# Patient Record
Sex: MALE | Race: WHITE
[De-identification: names, ages, dates, MRNs, and addresses within clinical notes are randomized per-mention and may not be internally consistent; named-entity substitution may affect disease eponyms.]

---

## 2020-01-23 ENCOUNTER — HOSPITAL ENCOUNTER (EMERGENCY)
Dept: HOSPITAL 47 - EC | Age: 45
LOS: 1 days | Discharge: HOME | End: 2020-01-24
Payer: COMMERCIAL

## 2020-01-23 VITALS — TEMPERATURE: 97.8 F

## 2020-01-23 DIAGNOSIS — R10.10: Primary | ICD-10-CM

## 2020-01-23 DIAGNOSIS — R74.0: ICD-10-CM

## 2020-01-23 DIAGNOSIS — Z88.0: ICD-10-CM

## 2020-01-23 DIAGNOSIS — Z90.49: ICD-10-CM

## 2020-01-23 DIAGNOSIS — R11.0: ICD-10-CM

## 2020-01-23 LAB
ALBUMIN SERPL-MCNC: 4.7 G/DL (ref 3.5–5)
ALP SERPL-CCNC: 97 U/L (ref 38–126)
ALT SERPL-CCNC: 157 U/L (ref 4–49)
AMYLASE SERPL-CCNC: 64 U/L (ref 30–110)
ANION GAP SERPL CALC-SCNC: 11 MMOL/L
AST SERPL-CCNC: 337 U/L (ref 17–59)
BASOPHILS # BLD AUTO: 0.1 K/UL (ref 0–0.2)
BASOPHILS NFR BLD AUTO: 1 %
BUN SERPL-SCNC: 12 MG/DL (ref 9–20)
CALCIUM SPEC-MCNC: 9.2 MG/DL (ref 8.4–10.2)
CHLORIDE SERPL-SCNC: 109 MMOL/L (ref 98–107)
CO2 SERPL-SCNC: 22 MMOL/L (ref 22–30)
EOSINOPHIL # BLD AUTO: 0.1 K/UL (ref 0–0.7)
EOSINOPHIL NFR BLD AUTO: 1 %
ERYTHROCYTE [DISTWIDTH] IN BLOOD BY AUTOMATED COUNT: 4.83 M/UL (ref 4.3–5.9)
ERYTHROCYTE [DISTWIDTH] IN BLOOD: 12.9 % (ref 11.5–15.5)
GLUCOSE SERPL-MCNC: 108 MG/DL (ref 74–99)
HCT VFR BLD AUTO: 42.5 % (ref 39–53)
HGB BLD-MCNC: 14.3 GM/DL (ref 13–17.5)
LYMPHOCYTES # SPEC AUTO: 2.2 K/UL (ref 1–4.8)
LYMPHOCYTES NFR SPEC AUTO: 23 %
MCH RBC QN AUTO: 29.6 PG (ref 25–35)
MCHC RBC AUTO-ENTMCNC: 33.7 G/DL (ref 31–37)
MCV RBC AUTO: 87.9 FL (ref 80–100)
MONOCYTES # BLD AUTO: 0.5 K/UL (ref 0–1)
MONOCYTES NFR BLD AUTO: 5 %
NEUTROPHILS # BLD AUTO: 6.6 K/UL (ref 1.3–7.7)
NEUTROPHILS NFR BLD AUTO: 69 %
PH UR: 7 [PH] (ref 5–8)
PLATELET # BLD AUTO: 261 K/UL (ref 150–450)
POTASSIUM SERPL-SCNC: 4.3 MMOL/L (ref 3.5–5.1)
PROT SERPL-MCNC: 7.9 G/DL (ref 6.3–8.2)
SODIUM SERPL-SCNC: 142 MMOL/L (ref 137–145)
SP GR UR: 1.01 (ref 1–1.03)
UROBILINOGEN UR QL STRIP: 2 MG/DL (ref ?–2)
WBC # BLD AUTO: 9.6 K/UL (ref 3.8–10.6)

## 2020-01-23 PROCEDURE — 81003 URINALYSIS AUTO W/O SCOPE: CPT

## 2020-01-23 PROCEDURE — 96376 TX/PRO/DX INJ SAME DRUG ADON: CPT

## 2020-01-23 PROCEDURE — 82150 ASSAY OF AMYLASE: CPT

## 2020-01-23 PROCEDURE — 74018 RADEX ABDOMEN 1 VIEW: CPT

## 2020-01-23 PROCEDURE — 96361 HYDRATE IV INFUSION ADD-ON: CPT

## 2020-01-23 PROCEDURE — 80053 COMPREHEN METABOLIC PANEL: CPT

## 2020-01-23 PROCEDURE — 74177 CT ABD & PELVIS W/CONTRAST: CPT

## 2020-01-23 PROCEDURE — 83690 ASSAY OF LIPASE: CPT

## 2020-01-23 PROCEDURE — 36415 COLL VENOUS BLD VENIPUNCTURE: CPT

## 2020-01-23 PROCEDURE — 76705 ECHO EXAM OF ABDOMEN: CPT

## 2020-01-23 PROCEDURE — 99284 EMERGENCY DEPT VISIT MOD MDM: CPT

## 2020-01-23 PROCEDURE — 96374 THER/PROPH/DIAG INJ IV PUSH: CPT

## 2020-01-23 PROCEDURE — 96375 TX/PRO/DX INJ NEW DRUG ADDON: CPT

## 2020-01-23 PROCEDURE — 85025 COMPLETE CBC W/AUTO DIFF WBC: CPT

## 2020-01-23 NOTE — XR
EXAMINATION TYPE: XR KUB 2 views

 

DATE OF EXAM: 1/23/2020 9:04 PM

 

CLINICAL HISTORY:  Abdominal pain, epigastric. History colitis

 

TECHNIQUE: 2 upright views

 

COMPARISON: None.

 

FINDINGS: The visualized lung bases and pleural spaces are negative. 

 

The stomach is fluid-filled. 

 

There is no pneumoperitoneum or pneumatosis. 

 

There is excessive volume of stool throughout the colon. 

 

There is no bowel obstruction.

 

No acute skeletal or soft tissue findings.

 

IMPRESSION: No definite acute radiographic process.

## 2020-01-23 NOTE — CT
EXAMINATION TYPE: CT abdomen pelvis w con

 

DATE OF EXAM: 1/23/2020

 

COMPARISON: None

 

HISTORY: epigastric abd pain

 

CT DLP: 1377.5 mGycm

Automated exposure control for dose reduction was used.

 

CONTRAST: 

Performed with IV Contrast, patient injected with 100 mL of Isovue 300.

 

Multiple axial sections were obtained from the diaphragm to the floor the pelvis with intravenous con
trast Isovue 100 mL.

 

FINDINGS:

Lung bases are clear. There is no pleural effusion. Heart size is normal. There is no pericardial eff
usion. Stomach appears normal.

 

Liver spleen pancreas appear normal. There are clips from cholecystectomy. Bile ducts are not dilated
.

 

There is no adrenal mass. Kidneys show satisfactory contrast opacification. There is no hydronephrosi
s. Appendix appears normal.

 

There is no mesenteric edema. There is no ascites or free air. There is no sign of a bowel obstructio
n.

 

Lumbar vertebra have normal spacing and alignment. There is no compression fracture. Bony pelvis appe
ars intact.

 

IMPRESSION:

Negative CT scan of the abdomen and pelvis. Normal appendix.

## 2020-01-23 NOTE — US
EXAMINATION TYPE: US abdomen limited

 

DATE OF EXAM:   1/23/2020

 

COMPARISON: NONE

 

CLINICAL HISTORY: RUQ, binh 4 months ago, transaminitis. RUQ pain x 7 hours. Hx cholecystectomy 4 mo
nths ago. Transaminitis.

 

EXAM MEASUREMENTS:

Liver Length:  16.2 cm   

Gallbladder Wall:  Cholecystectomy   

CBD:  0.80 cm

Right Kidney:  11.0 x 5.3 x 4.5 cm 

 

***Limited visualization due to overlying bowel gas.

 

Pancreas:  Obscured by overlying bowel gas.

Liver:  No abnormalities seen.  

**Evidence for sonographic Llanes's sign:  No

CBD:  Measures wnl post-cholecystectomy. 

Right Kidney:  No hydronephrosis or masses seen   

 

 

IMPRESSION: Negative examination.

## 2020-01-23 NOTE — ED
General Adult HPI





- General


Chief complaint: Abdominal Pain


Stated complaint: Abd pain


Time Seen by Provider: 01/23/20 19:46


Source: patient, RN notes reviewed


Mode of arrival: ambulatory


Limitations: no limitations





- History of Present Illness


Initial comments: 





44-year-old male presents to the emergency department for a chief complaint of 

upper abdominal pain.  This has been ongoing since this afternoon around 3:00 

PM.  States it comes in waves.  Patient states that this feels exactly like his 

"gallbladder attacks".  States that he had his gallbladder removed in October 

through another health system.  He states that his follow-ups have all been n

ormal.  He states he did have the ducts checked and there were no stones after 

the surgery.  Patient states the pain earlier caused him to have some nausea as 

well.  He states he has felt bloated all week and that also proceeded his 

previous gallbladder attack.Patient has no other complaints at this time 

including shortness of breath, chest pain, nausea or vomiting, headache, or 

visual changes.





- Related Data


                                    Allergies











Allergy/AdvReac Type Severity Reaction Status Date / Time


 


Penicillins Allergy  Unknown Verified 01/23/20 19:01





   Childhood  














Review of Systems


ROS Statement: 


Those systems with pertinent positive or pertinent negative responses have been 

documented in the HPI.





ROS Other: All systems not noted in ROS Statement are negative.





Past Medical History


Past Medical History: No Reported History, Cancer


History of Any Multi-Drug Resistant Organisms: None Reported


Past Surgical History: Cholecystectomy


Past Psychological History: No Psychological Hx Reported


Smoking Status: Never smoker


Past Alcohol Use History: None Reported


Past Drug Use History: None Reported





General Exam


Limitations: no limitations


General appearance: alert, in no apparent distress


Head exam: Present: atraumatic, normocephalic, normal inspection


Eye exam: Present: normal appearance, PERRL, EOMI.  Absent: scleral icterus, 

conjunctival injection, periorbital swelling


ENT exam: Present: normal exam, mucous membranes moist


Neck exam: Present: normal inspection.  Absent: tenderness, meningismus, 

lymphadenopathy


Respiratory exam: Present: normal lung sounds bilaterally.  Absent: respiratory 

distress, wheezes, rales, rhonchi, stridor


Cardiovascular Exam: Present: regular rate, normal rhythm, normal heart sounds. 

Absent: systolic murmur, diastolic murmur, rubs, gallop, clicks


GI/Abdominal exam: Present: soft, normal bowel sounds.  Absent: distended, tende

rness, guarding, rebound, rigid


Neurological exam: Present: alert, oriented X3, CN II-XII intact





Course


                                   Vital Signs











  01/23/20





  18:57


 


Temperature 97.8 F


 


Pulse Rate 82


 


Respiratory 17





Rate 


 


Blood Pressure 119/79


 


O2 Sat by Pulse 98





Oximetry 














Medical Decision Making





- Medical Decision Making





CBC unremarkable.  CMP does show mild transaminitis however I do not have a 

comparison for patient.  Urinalysis unremarkable.  Abdomen ultrasound was 

initially ordered which revealed a negative exam.  Bile duct measures within 

normal limits post cholecystectomy.  CT abdomen and pelvis is negative.  Bile 

ducts are not dilated.  Liver spleen and pancreas appear normal.  Patient 

reevaluated and is pain-free at this time.  He does request a dose of Dilaudid 

before he leaves because he is concerned the pain may recur.  I discussed that 

he needs to follow up with his surgeon tomorrow.  I discussed that he needs to 

return here if you've any worsening symptoms that she does agree with.  He will 

also follow up with primary care for repeat liver enzymes.





- Lab Data


Result diagrams: 


                                 01/23/20 20:30





                                 01/23/20 20:30


                                   Lab Results











  01/23/20 01/23/20 01/23/20 Range/Units





  20:30 20:30 20:30 


 


WBC   9.6   (3.8-10.6)  k/uL


 


RBC   4.83   (4.30-5.90)  m/uL


 


Hgb   14.3   (13.0-17.5)  gm/dL


 


Hct   42.5   (39.0-53.0)  %


 


MCV   87.9   (80.0-100.0)  fL


 


MCH   29.6   (25.0-35.0)  pg


 


MCHC   33.7   (31.0-37.0)  g/dL


 


RDW   12.9   (11.5-15.5)  %


 


Plt Count   261   (150-450)  k/uL


 


Neutrophils %   69   %


 


Lymphocytes %   23   %


 


Monocytes %   5   %


 


Eosinophils %   1   %


 


Basophils %   1   %


 


Neutrophils #   6.6   (1.3-7.7)  k/uL


 


Lymphocytes #   2.2   (1.0-4.8)  k/uL


 


Monocytes #   0.5   (0-1.0)  k/uL


 


Eosinophils #   0.1   (0-0.7)  k/uL


 


Basophils #   0.1   (0-0.2)  k/uL


 


Sodium  142    (137-145)  mmol/L


 


Potassium  4.3    (3.5-5.1)  mmol/L


 


Chloride  109 H    ()  mmol/L


 


Carbon Dioxide  22    (22-30)  mmol/L


 


Anion Gap  11    mmol/L


 


BUN  12    (9-20)  mg/dL


 


Creatinine  0.75    (0.66-1.25)  mg/dL


 


Est GFR (CKD-EPI)AfAm  >90    (>60 ml/min/1.73 sqM)  


 


Est GFR (CKD-EPI)NonAf  >90    (>60 ml/min/1.73 sqM)  


 


Glucose  108 H    (74-99)  mg/dL


 


Calcium  9.2    (8.4-10.2)  mg/dL


 


Total Bilirubin  1.3    (0.2-1.3)  mg/dL


 


AST  337 H    (17-59)  U/L


 


ALT  157 H    (4-49)  U/L


 


Alkaline Phosphatase  97    ()  U/L


 


Total Protein  7.9    (6.3-8.2)  g/dL


 


Albumin  4.7    (3.5-5.0)  g/dL


 


Amylase  64    ()  U/L


 


Lipase  142    ()  U/L


 


Urine Color    Yellow  


 


Urine Appearance    Clear  (Clear)  


 


Urine pH    7.0  (5.0-8.0)  


 


Ur Specific Gravity    1.010  (1.001-1.035)  


 


Urine Protein    Negative  (Negative)  


 


Urine Glucose (UA)    Negative  (Negative)  


 


Urine Ketones    Negative  (Negative)  


 


Urine Blood    Negative  (Negative)  


 


Urine Nitrite    Negative  (Negative)  


 


Urine Bilirubin    Negative  (Negative)  


 


Urine Urobilinogen    2.0  (<2.0)  mg/dL


 


Ur Leukocyte Esterase    Negative  (Negative)  














Disposition


Clinical Impression: 


 Abdominal pain





Disposition: HOME SELF-CARE


Condition: Good


Instructions (If sedation given, give patient instructions):  Abdominal Pain 

(ED)


Additional Instructions: 


Please follow up with primary care for repeat liver enzymes.  Follow-up with 

surgeon as soon as possible.  If you have any worsening symptoms return to the 

emergency department.


Is patient prescribed a controlled substance at d/c from ED?: No


Referrals: 


Master Palencia MD [Primary Care Provider] - 1-2 days


Time of Disposition: 00:01

## 2020-01-24 VITALS — RESPIRATION RATE: 16 BRPM | HEART RATE: 65 BPM | DIASTOLIC BLOOD PRESSURE: 81 MMHG | SYSTOLIC BLOOD PRESSURE: 111 MMHG
